# Patient Record
Sex: MALE | URBAN - METROPOLITAN AREA
[De-identification: names, ages, dates, MRNs, and addresses within clinical notes are randomized per-mention and may not be internally consistent; named-entity substitution may affect disease eponyms.]

---

## 2017-02-22 ENCOUNTER — TELEPHONE (OUTPATIENT)
Dept: OPHTHALMOLOGY | Facility: CLINIC | Age: 22
End: 2017-02-22

## 2017-02-22 NOTE — TELEPHONE ENCOUNTER
Received a letter from patient's insurance [facility] stating that this patient should have a diabetic eye exam.

## 2017-02-27 NOTE — TELEPHONE ENCOUNTER
I have no telephone # or address on file, so I just sent his message from [facility] to scanning.